# Patient Record
Sex: FEMALE | Race: WHITE | NOT HISPANIC OR LATINO | Employment: OTHER | URBAN - NONMETROPOLITAN AREA
[De-identification: names, ages, dates, MRNs, and addresses within clinical notes are randomized per-mention and may not be internally consistent; named-entity substitution may affect disease eponyms.]

---

## 2017-03-15 ENCOUNTER — LAB (OUTPATIENT)
Dept: LAB | Facility: OTHER | Age: 77
End: 2017-03-15

## 2017-03-15 DIAGNOSIS — E78.5 HYPERLIPIDEMIA: ICD-10-CM

## 2017-03-15 DIAGNOSIS — I10 ESSENTIAL HYPERTENSION: ICD-10-CM

## 2017-03-15 DIAGNOSIS — E55.9 VITAMIN D DEFICIENCY: ICD-10-CM

## 2017-03-15 LAB
ALBUMIN SERPL-MCNC: 3.6 G/DL (ref 3.2–5.5)
ALBUMIN/GLOB SERPL: 1.5 G/DL (ref 1–3)
ALP SERPL-CCNC: 98 U/L (ref 15–121)
ALT SERPL W P-5'-P-CCNC: 16 U/L (ref 10–60)
ANION GAP SERPL CALCULATED.3IONS-SCNC: 6 MMOL/L (ref 5–15)
ARTICHOKE IGE QN: 151 MG/DL (ref 0–129)
AST SERPL-CCNC: 21 U/L (ref 10–60)
BASOPHILS # BLD AUTO: 0.04 10*3/MM3 (ref 0–0.2)
BASOPHILS NFR BLD AUTO: 0.3 % (ref 0–2)
BILIRUB SERPL-MCNC: 0.3 MG/DL (ref 0.2–1)
BUN BLD-MCNC: 12 MG/DL (ref 8–25)
BUN/CREAT SERPL: 24 (ref 7–25)
CALCIUM SPEC-SCNC: 9.2 MG/DL (ref 8.4–10.8)
CHLORIDE SERPL-SCNC: 103 MMOL/L (ref 100–112)
CO2 SERPL-SCNC: 27 MMOL/L (ref 20–32)
CREAT BLD-MCNC: 0.5 MG/DL (ref 0.4–1.3)
DEPRECATED RDW RBC AUTO: 45.9 FL (ref 36.4–46.3)
EOSINOPHIL # BLD AUTO: 0.18 10*3/MM3 (ref 0–0.7)
EOSINOPHIL NFR BLD AUTO: 1.2 % (ref 0–7)
ERYTHROCYTE [DISTWIDTH] IN BLOOD BY AUTOMATED COUNT: 13.9 % (ref 11.5–14.5)
GFR SERPL CREATININE-BSD FRML MDRD: 120 ML/MIN/1.73 (ref 39–90)
GLOBULIN UR ELPH-MCNC: 2.4 GM/DL (ref 2.5–4.6)
GLUCOSE BLD-MCNC: 94 MG/DL (ref 70–100)
HCT VFR BLD AUTO: 42.9 % (ref 35–45)
HGB BLD-MCNC: 14.1 G/DL (ref 12–15.5)
LYMPHOCYTES # BLD AUTO: 2.23 10*3/MM3 (ref 0.6–4.2)
LYMPHOCYTES NFR BLD AUTO: 15.5 % (ref 10–50)
MCH RBC QN AUTO: 30.7 PG (ref 26.5–34)
MCHC RBC AUTO-ENTMCNC: 32.9 G/DL (ref 31.4–36)
MCV RBC AUTO: 93.5 FL (ref 80–98)
MONOCYTES # BLD AUTO: 0.83 10*3/MM3 (ref 0–0.9)
MONOCYTES NFR BLD AUTO: 5.8 % (ref 0–12)
NEUTROPHILS # BLD AUTO: 11.15 10*3/MM3 (ref 2–8.6)
NEUTROPHILS NFR BLD AUTO: 77.2 % (ref 37–80)
PLATELET # BLD AUTO: 368 10*3/MM3 (ref 150–450)
PMV BLD AUTO: 9.6 FL (ref 8–12)
POTASSIUM BLD-SCNC: 4.1 MMOL/L (ref 3.4–5.4)
PROT SERPL-MCNC: 6 G/DL (ref 6.7–8.2)
RBC # BLD AUTO: 4.59 10*6/MM3 (ref 3.77–5.16)
SODIUM BLD-SCNC: 136 MMOL/L (ref 134–146)
WBC NRBC COR # BLD: 14.43 10*3/MM3 (ref 3.2–9.8)

## 2017-03-15 PROCEDURE — 82306 VITAMIN D 25 HYDROXY: CPT | Performed by: INTERNAL MEDICINE

## 2017-03-15 PROCEDURE — 85025 COMPLETE CBC W/AUTO DIFF WBC: CPT | Performed by: INTERNAL MEDICINE

## 2017-03-15 PROCEDURE — 80053 COMPREHEN METABOLIC PANEL: CPT | Performed by: INTERNAL MEDICINE

## 2017-03-15 PROCEDURE — 83721 ASSAY OF BLOOD LIPOPROTEIN: CPT | Performed by: INTERNAL MEDICINE

## 2017-03-16 LAB — 25(OH)D3 SERPL-MCNC: 27.2 NG/ML (ref 30–100)

## 2017-03-20 ENCOUNTER — OFFICE VISIT (OUTPATIENT)
Dept: FAMILY MEDICINE CLINIC | Facility: CLINIC | Age: 77
End: 2017-03-20

## 2017-03-20 VITALS
SYSTOLIC BLOOD PRESSURE: 120 MMHG | HEART RATE: 88 BPM | TEMPERATURE: 97.4 F | WEIGHT: 106.8 LBS | DIASTOLIC BLOOD PRESSURE: 84 MMHG | BODY MASS INDEX: 19.65 KG/M2 | HEIGHT: 62 IN

## 2017-03-20 DIAGNOSIS — E55.9 VITAMIN D DEFICIENCY: ICD-10-CM

## 2017-03-20 DIAGNOSIS — I65.29 STENOSIS OF CAROTID ARTERY, UNSPECIFIED LATERALITY: ICD-10-CM

## 2017-03-20 DIAGNOSIS — Z12.31 SCREENING MAMMOGRAM, ENCOUNTER FOR: ICD-10-CM

## 2017-03-20 DIAGNOSIS — F17.200 TOBACCO DEPENDENCE SYNDROME: ICD-10-CM

## 2017-03-20 DIAGNOSIS — I10 ESSENTIAL HYPERTENSION: ICD-10-CM

## 2017-03-20 DIAGNOSIS — E78.5 HYPERLIPIDEMIA, UNSPECIFIED HYPERLIPIDEMIA TYPE: Primary | ICD-10-CM

## 2017-03-20 DIAGNOSIS — I65.23 OCCLUSION AND STENOSIS OF BILATERAL CAROTID ARTERIES: ICD-10-CM

## 2017-03-20 PROCEDURE — 99214 OFFICE O/P EST MOD 30 MIN: CPT | Performed by: INTERNAL MEDICINE

## 2017-03-20 NOTE — PROGRESS NOTES
Subjective      History of Present Illness       Navya Mckeon is a 77 y.o. female  here  for six month follow up on cerebrovascular disease, hyperlipidemia, hypertension, tobacco abuse, and carotid artery atherosclerosis among other issues.  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  Left carotid plaque showed improvement with carotid Doppler 09/2015, but she continues to smoke a pack of cigarettes daily.  She tried Chantix in the past, but was intolerant due to nightmares.  She was able to stop smoking for a couple of months.  She has also resumed statin therapy twice weekly.  We ordered a repeat carotid Doppler to evaluate. However, she did not have this completed.  We will reschedule this as well as a repeat mammogram.      Repeat DEXA 09/2016 reveals osteoporosis of the hip.  She is not taking her Fosamax.  She was concerned after a friend went to the dentist and had dental procedures delayed due to the patient being on Fosamax.  I discussed the benefits outweighing the risks and recommended she resume the Fosamax and she is in agreement.     She had a cholecystectomy by Dr. Barrientos ten days ago.  She was told she had hypokalemia with labs obtained prior to surgery.  She has been taking a supplement from the [facility].  She is given reassurance that her potassium is now normal at 4.1 with labs drawn last week and the low potassium was more than likely due to her gallbladder disease.       The patient's relevant past medical, surgical, and social history was reviewed in Epic.   Lab results are reviewed with the patient today.  LDL is 151.  CMP reveals fasting glucose 94.  Vitamin D remains decreased at 27.2.   Liver and renal function normal.  White count slightly elevated at 14.         Review of Systems   Constitutional: Negative for chills, fatigue and fever.   HENT: Negative for congestion, ear pain, postnasal drip,  "sinus pressure and sore throat.    Respiratory: Negative for cough, shortness of breath and wheezing.    Cardiovascular: Negative for chest pain, palpitations and leg swelling.   Gastrointestinal: Negative for abdominal pain, blood in stool, constipation, diarrhea, nausea and vomiting.   Endocrine: Negative for cold intolerance, heat intolerance, polydipsia and polyuria.   Genitourinary: Negative for dysuria, frequency, hematuria and urgency.   Skin: Negative for rash.   Neurological: Negative for syncope and weakness.        Objective     Visit Vitals   • /84   • Pulse 88   • Temp 97.4 °F (36.3 °C) (Oral)   • Ht 62\" (157.5 cm)   • Wt 106 lb 12.8 oz (48.4 kg)   • BMI 19.53 kg/m2       Physical Exam   Constitutional: She is oriented to person, place, and time. She appears well-developed and well-nourished. No distress.   HENT:   Head: Normocephalic and atraumatic.   Nose: Right sinus exhibits no maxillary sinus tenderness and no frontal sinus tenderness. Left sinus exhibits no maxillary sinus tenderness and no frontal sinus tenderness.   Mouth/Throat: Uvula is midline, oropharynx is clear and moist and mucous membranes are normal. No oral lesions. No tonsillar exudate.   Benign posterior tobacco changes noted.    Eyes: Conjunctivae and EOM are normal. Pupils are equal, round, and reactive to light.   Neck: Trachea normal. Neck supple. No JVD present. Carotid bruit is not present. No tracheal deviation present. No thyroid mass and no thyromegaly present.   Turbulent flow over the carotids.    Cardiovascular: Normal rate, regular rhythm and normal heart sounds.   No extrasystoles are present. PMI is not displaced.    No murmur heard.  Pulmonary/Chest: Effort normal and breath sounds normal. No accessory muscle usage. No respiratory distress. She has no decreased breath sounds. She has no wheezes. She has no rhonchi. She has no rales.   Chronic lung sounds.    Abdominal: Soft. Bowel sounds are normal. She exhibits " no distension. There is no hepatosplenomegaly. There is no tenderness.       Vascular Status -  Her exam exhibits right foot vasculature normal. Her exam exhibits no right foot edema. Her exam exhibits left foot vasculature normal. Her exam exhibits no left foot edema.  Lymphadenopathy:     She has no cervical adenopathy.   Neurological: She is alert and oriented to person, place, and time. No cranial nerve deficit. Coordination normal.   Skin: Skin is warm, dry and intact. No rash noted. No cyanosis. Nails show no clubbing.   Psychiatric: She has a normal mood and affect. Her speech is normal and behavior is normal. Thought content normal.   Vitals reviewed.           PHQ-9 Depression Screening 3/20/2017   Little interest or pleasure in doing things 0   Feeling down, depressed, or hopeless 0   Trouble falling or staying asleep, or sleeping too much 0   Feeling tired or having little energy 2   Poor appetite or overeating 0   Feeling bad about yourself - or that you are a failure or have let yourself or your family down 0   Trouble concentrating on things, such as reading the newspaper or watching television 0   Moving or speaking so slowly that other people could have noticed. Or the opposite - being so fidgety or restless that you have been moving around a lot more than usual 0   Thoughts that you would be better off dead, or of hurting yourself in some way 0   PHQ-9 Total Score 2   If you checked off any problems, how difficult have these problems made it for you to do your work, take care of things at home, or get along with other people? Not difficult at all       Assessment/Plan      I continue to urge her to stop use of tobacco products and never start again.  She has been intolerant to Chantix in the past due to nightmares.  However, she was able to stop for two months.       Schedule carotid Doppler and mammogram.      Resume Fosamax 70 mg once weekly.     Increase Zocor 20 mg to three times weekly.       Scribed for Dr. Aceves by Makeda Pu[facility].     Diagnoses and all orders for this visit:    Hyperlipidemia, unspecified hyperlipidemia type  -     Lipid Panel; Future    Tobacco dependence syndrome    Stenosis of carotid artery, unspecified laterality  -     US Carotid Bilateral; Future    Essential hypertension  -     CBC Auto Differential; Future  -     Comprehensive Metabolic Panel; Future    Vitamin D deficiency  -     Vitamin D 25 Hydroxy; Future    Screening mammogram, encounter for  -     Mammo Screening Digital Tomosynthesis Bilateral With CAD; Future    Occlusion and stenosis of bilateral carotid arteries   -     US Carotid Bilateral; Future      Lab on 03/15/2017   Component Date Value Ref Range Status   • 25 Hydroxy, Vitamin D 03/15/2017 27.2* 30.0 - 100.0 ng/ml Final   • WBC 03/15/2017 14.43* 3.20 - 9.80 10*3/mm3 Final   • RBC 03/15/2017 4.59  3.77 - 5.16 10*6/mm3 Final   • Hemoglobin 03/15/2017 14.1  12.0 - 15.5 g/dL Final   • Hematocrit 03/15/2017 42.9  35.0 - 45.0 % Final   • MCV 03/15/2017 93.5  80.0 - 98.0 fL Final   • MCH 03/15/2017 30.7  26.5 - 34.0 pg Final   • MCHC 03/15/2017 32.9  31.4 - 36.0 g/dL Final   • RDW 03/15/2017 13.9  11.5 - 14.5 % Final   • RDW-SD 03/15/2017 45.9  36.4 - 46.3 fl Final   • MPV 03/15/2017 9.6  8.0 - 12.0 fL Final   • Platelets 03/15/2017 368  150 - 450 10*3/mm3 Final   • Neutrophil % 03/15/2017 77.2  37.0 - 80.0 % Final   • Lymphocyte % 03/15/2017 15.5  10.0 - 50.0 % Final   • Monocyte % 03/15/2017 5.8  0.0 - 12.0 % Final   • Eosinophil % 03/15/2017 1.2  0.0 - 7.0 % Final   • Basophil % 03/15/2017 0.3  0.0 - 2.0 % Final   • Neutrophils, Absolute 03/15/2017 11.15* 2.00 - 8.60 10*3/mm3 Final   • Lymphocytes, Absolute 03/15/2017 2.23  0.60 - 4.20 10*3/mm3 Final   • Monocytes, Absolute 03/15/2017 0.83  0.00 - 0.90 10*3/mm3 Final   • Eosinophils, Absolute 03/15/2017 0.18  0.00 - 0.70 10*3/mm3 Final   • Basophils, Absolute 03/15/2017 0.04  0.00 - 0.20 10*3/mm3  Final   • LDL Cholesterol  03/15/2017 151* 0 - 129 mg/dL Final   • Glucose 03/15/2017 94  70 - 100 mg/dL Final   • BUN 03/15/2017 12  8 - 25 mg/dL Final   • Creatinine 03/15/2017 0.50  0.40 - 1.30 mg/dL Final   • Sodium 03/15/2017 136  134 - 146 mmol/L Final   • Potassium 03/15/2017 4.1  3.4 - 5.4 mmol/L Final   • Chloride 03/15/2017 103  100 - 112 mmol/L Final   • CO2 03/15/2017 27.0  20.0 - 32.0 mmol/L Final   • Calcium 03/15/2017 9.2  8.4 - 10.8 mg/dL Final   • Total Protein 03/15/2017 6.0* 6.7 - 8.2 g/dL Final   • Albumin 03/15/2017 3.60  3.20 - 5.50 g/dL Final   • ALT (SGPT) 03/15/2017 16  10 - 60 U/L Final   • AST (SGOT) 03/15/2017 21  10 - 60 U/L Final   • Alkaline Phosphatase 03/15/2017 98  15 - 121 U/L Final   • Total Bilirubin 03/15/2017 0.3  0.2 - 1.0 mg/dL Final   • eGFR Non African Amer 03/15/2017 120* 39 - 90 mL/min/1.73 Final   • Globulin 03/15/2017 2.4* 2.5 - 4.6 gm/dL Final   • A/G Ratio 03/15/2017 1.5  1.0 - 3.0 g/dL Final   • BUN/Creatinine Ratio 03/15/2017 24.0  7.0 - 25.0 Final   • Anion Gap 03/15/2017 6.0  5.0 - 15.0 mmol/L Final   ]